# Patient Record
Sex: FEMALE | Race: WHITE | NOT HISPANIC OR LATINO | URBAN - METROPOLITAN AREA
[De-identification: names, ages, dates, MRNs, and addresses within clinical notes are randomized per-mention and may not be internally consistent; named-entity substitution may affect disease eponyms.]

---

## 2023-05-11 ENCOUNTER — OFFICE (OUTPATIENT)
Dept: URBAN - METROPOLITAN AREA CLINIC 67 | Facility: CLINIC | Age: 58
End: 2023-05-11
Payer: COMMERCIAL

## 2023-05-11 VITALS — HEIGHT: 61 IN | WEIGHT: 113 LBS

## 2023-05-11 DIAGNOSIS — R11.0 NAUSEA: ICD-10-CM

## 2023-05-11 DIAGNOSIS — K59.09 OTHER CONSTIPATION: ICD-10-CM

## 2023-05-11 DIAGNOSIS — G47.9 SLEEP DISORDER, UNSPECIFIED: ICD-10-CM

## 2023-05-11 DIAGNOSIS — R10.84 GENERALIZED ABDOMINAL PAIN: ICD-10-CM

## 2023-05-11 DIAGNOSIS — E87.1 HYPO-OSMOLALITY AND HYPONATREMIA: ICD-10-CM

## 2023-05-11 DIAGNOSIS — B19.20 UNSPECIFIED VIRAL HEPATITIS C WITHOUT HEPATIC COMA: ICD-10-CM

## 2023-05-11 DIAGNOSIS — R41.0 DISORIENTATION, UNSPECIFIED: ICD-10-CM

## 2023-05-11 DIAGNOSIS — R63.0 ANOREXIA: ICD-10-CM

## 2023-05-11 PROCEDURE — 99204 OFFICE O/P NEW MOD 45 MIN: CPT

## 2023-05-11 RX ORDER — LACTULOSE 10 G/15ML
SOLUTION ORAL; RECTAL
Qty: 1000 | Refills: 2 | Status: ACTIVE
Start: 2023-05-11

## 2023-05-11 NOTE — SERVICEHPINOTES
Catrina Otero   is seen for an initial visit today.     brPatient referred for hepatitis C eval. also due for colon cancer screening- was referred in 2021 but never made appt. brHx alcohol abuse.  She reports to day with her mom who is her . Patient seems uncomfortable and is not wanting to sit still during appt. she actually was tested for Hep C in 2007 and was never treated, tested later and was found undetectable and did not need treatment. 
br
br patient's main concerns today are sodium levels. she reports having high and low sodium, issues with memory, and sleeping. 
br she has loss of appetite, nausea, takes phenergen for sleep and nausea. She reports having burning sensation over entire body. 
br she reports having hx heavy ETOH 2004- 2022. Stopped IV drug in the past. None now. Spouse had Hep C. 
brshe saw kidney doctor in the fall and is seeing another one in this building sometime in the next month, her main concern, again is the sodium and sleeping. 
br she reports having constipation as well, takes prune juice to help. she reports having no blood in the stools, said she had a colonoscopy at some point, doesn't recall when.
br Reports no issues with her heart bur reports copd. 
br
br she did have a bowl perf and hernia repair in 2010. 4/17/23- Hep A, B neg, Hep C Ab positive, HCV undetectable. Tbili 0.8, ALP 92, AST 23, ALT 21. Na 125, Plt 275, PETH neg. br12/2022- ALT 35, 11/2022- LFT normal, br8/12/2021- lipids high, A1C6.6, Tbili 0.2, , AST 36, ALT 38(H)

## 2023-05-11 NOTE — SERVICENOTES
1. Constipation- lactulose twice a day. 
2. Fibroscan to check liver scarring. 
3. Call me in 1 week to let me know who and when the kidney doctor appt is scheduled. 
4. Start prilosec every morning for nausea. 
5. Ultimately we need to do EGD and colonoscopy to assess causes for the weight loss, nausea, lack of appetite, and colon cancer screening. we gave information about colonoscopy prep today. 
6. Hep C level was undetectable and no treatment is needed at this time.